# Patient Record
Sex: FEMALE | Race: WHITE
[De-identification: names, ages, dates, MRNs, and addresses within clinical notes are randomized per-mention and may not be internally consistent; named-entity substitution may affect disease eponyms.]

---

## 2018-08-22 ENCOUNTER — HOSPITAL ENCOUNTER (EMERGENCY)
Dept: HOSPITAL 88 - ER | Age: 28
Discharge: OTHER INSTITUTION | End: 2018-08-22
Payer: COMMERCIAL

## 2018-08-22 VITALS — DIASTOLIC BLOOD PRESSURE: 63 MMHG | SYSTOLIC BLOOD PRESSURE: 110 MMHG

## 2018-08-22 VITALS — HEIGHT: 66 IN | BODY MASS INDEX: 45 KG/M2 | WEIGHT: 280 LBS
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
## 2018-08-22 DIAGNOSIS — I95.9: ICD-10-CM

## 2018-08-22 DIAGNOSIS — R10.84: Primary | ICD-10-CM

## 2018-08-22 DIAGNOSIS — S30.1XXA: ICD-10-CM

## 2018-08-22 LAB
ALBUMIN SERPL-MCNC: 3.7 G/DL (ref 3.5–5)
ALBUMIN/GLOB SERPL: 1.1 {RATIO} (ref 0.8–2)
ALP SERPL-CCNC: 74 IU/L (ref 40–150)
ALT SERPL-CCNC: 17 IU/L (ref 0–55)
AMYLASE SERPL-CCNC: 45 U/L (ref 25–125)
ANION GAP SERPL CALC-SCNC: 15.5 MMOL/L (ref 8–16)
BASOPHILS # BLD AUTO: 0.1 10*3/UL (ref 0–0.1)
BASOPHILS NFR BLD AUTO: 0.3 % (ref 0–1)
BUN SERPL-MCNC: 14 MG/DL (ref 7–26)
BUN/CREAT SERPL: 18 (ref 6–25)
CALCIUM SERPL-MCNC: 9.1 MG/DL (ref 8.4–10.2)
CHLORIDE SERPL-SCNC: 106 MMOL/L (ref 98–107)
CK MB SERPL-MCNC: 1.4 NG/ML (ref 0–5)
CK SERPL-CCNC: 126 IU/L (ref 29–168)
CO2 SERPL-SCNC: 20 MMOL/L (ref 22–29)
DEPRECATED APTT PLAS QN: 22.4 SECONDS (ref 23.8–35.5)
DEPRECATED INR PLAS: 1.1
DEPRECATED NEUTROPHILS # BLD AUTO: 27.4 10*3/UL (ref 2.1–6.9)
EGFRCR SERPLBLD CKD-EPI 2021: > 60 ML/MIN (ref 60–?)
EOSINOPHIL # BLD AUTO: 0 10*3/UL (ref 0–0.4)
EOSINOPHIL NFR BLD AUTO: 0.1 % (ref 0–6)
ERYTHROCYTE [DISTWIDTH] IN CORD BLOOD: 13.3 % (ref 11.7–14.4)
GLOBULIN PLAS-MCNC: 3.5 G/DL (ref 2.3–3.5)
GLUCOSE SERPLBLD-MCNC: 113 MG/DL (ref 74–118)
HCT VFR BLD AUTO: 38.4 % (ref 34.2–44.1)
HGB BLD-MCNC: 12.7 G/DL (ref 12–16)
LIPASE SERPL-CCNC: 20 U/L (ref 8–78)
LYMPHOCYTES # BLD: 3.6 10*3/UL (ref 1–3.2)
LYMPHOCYTES NFR BLD AUTO: 10.8 % (ref 18–39.1)
LYMPHOCYTES NFR BLD MANUAL: 6 % (ref 19–48)
MACROCYTES BLD QL SMEAR: SLIGHT
MAGNESIUM SERPL-MCNC: 2 MG/DL (ref 1.3–2.1)
MCH RBC QN AUTO: 28.6 PG (ref 28–32)
MCHC RBC AUTO-ENTMCNC: 33.1 G/DL (ref 31–35)
MCV RBC AUTO: 86.5 FL (ref 81–99)
MONOCYTES # BLD AUTO: 1.8 10*3/UL (ref 0.2–0.8)
MONOCYTES NFR BLD AUTO: 5.3 % (ref 4.4–11.3)
MONOCYTES NFR BLD MANUAL: 6 % (ref 3.4–9)
NEUTS SEG NFR BLD AUTO: 82.8 % (ref 38.7–80)
NEUTS SEG NFR BLD MANUAL: 87 % (ref 40–74)
PLAT MORPH BLD: NORMAL
PLATELET # BLD AUTO: 430 X10E3/UL (ref 140–360)
PLATELET # BLD EST: (no result) 10*3/UL
POTASSIUM SERPL-SCNC: 3.5 MMOL/L (ref 3.5–5.1)
PROTHROMBIN TIME: 13.4 SECONDS (ref 11.9–14.5)
RBC # BLD AUTO: 4.44 X10E6/UL (ref 3.6–5.1)
RBC MORPH BLD: NORMAL
SODIUM SERPL-SCNC: 138 MMOL/L (ref 136–145)

## 2018-08-22 PROCEDURE — 80053 COMPREHEN METABOLIC PANEL: CPT

## 2018-08-22 PROCEDURE — 85730 THROMBOPLASTIN TIME PARTIAL: CPT

## 2018-08-22 PROCEDURE — 83690 ASSAY OF LIPASE: CPT

## 2018-08-22 PROCEDURE — 93005 ELECTROCARDIOGRAM TRACING: CPT

## 2018-08-22 PROCEDURE — 36415 COLL VENOUS BLD VENIPUNCTURE: CPT

## 2018-08-22 PROCEDURE — 82550 ASSAY OF CK (CPK): CPT

## 2018-08-22 PROCEDURE — 82553 CREATINE MB FRACTION: CPT

## 2018-08-22 PROCEDURE — 86850 RBC ANTIBODY SCREEN: CPT

## 2018-08-22 PROCEDURE — 71260 CT THORAX DX C+: CPT

## 2018-08-22 PROCEDURE — 74177 CT ABD & PELVIS W/CONTRAST: CPT

## 2018-08-22 PROCEDURE — 86922 COMPATIBILITY TEST ANTIGLOB: CPT

## 2018-08-22 PROCEDURE — 84484 ASSAY OF TROPONIN QUANT: CPT

## 2018-08-22 PROCEDURE — 84702 CHORIONIC GONADOTROPIN TEST: CPT

## 2018-08-22 PROCEDURE — 85025 COMPLETE CBC W/AUTO DIFF WBC: CPT

## 2018-08-22 PROCEDURE — 82150 ASSAY OF AMYLASE: CPT

## 2018-08-22 PROCEDURE — 86900 BLOOD TYPING SEROLOGIC ABO: CPT

## 2018-08-22 PROCEDURE — 83735 ASSAY OF MAGNESIUM: CPT

## 2018-08-22 PROCEDURE — 86920 COMPATIBILITY TEST SPIN: CPT

## 2018-08-22 PROCEDURE — 85610 PROTHROMBIN TIME: CPT

## 2018-08-22 PROCEDURE — 99284 EMERGENCY DEPT VISIT MOD MDM: CPT

## 2018-08-22 NOTE — DIAGNOSTIC IMAGING REPORT
EXAM: CT CHEST W, CT ABDOMEN/PELVIS W

INDICATION: \S\trauma

COMPARISON: None.

TECHNIQUE: Chest, abdomen and pelvis were scanned utilizing a multidetector

helical scanner from the lung apex to the pubic symphysis. Coronal and sagittal

reformations were obtained

            IV CONTRAST: 100 mL Isovue 300/370



FINDINGS:



LINES and TUBES: None.



LUNGS/AIRWAYS/PLEURA:  No contusion or consolidation.. The pleural spaces are

clear.



HEART AND MEDIASTINUM: Mild anterior mediastinal soft tissue likely thymus. No

mediastinal or periaortic hematoma.  The heart is normal in size.. There is no

pericardial effusion.     



HEPATOBILIARY/GALLBLADDER: Punctate too small to characterize liver

hypodensities. Otherwise unremarkable. No laceration. 



SPLEEN: No laceration or hematoma. 



PANCREAS: No laceration or hematoma.  



ADRENALS: No nodules.



KIDNEYS/URETERS: No laceration or hematoma. No hydronephrosis. 



GI TRACT: No obstruction or wall thickening.  Normal appendix.  



PELVIC ORGANS/BLADDER: Unremarkable. Incidental left corpus luteum.



LYMPH NODES: No lymphadenopathy.



VESSELS: Unremarkable.



PERITONEUM / RETROPERITONEUM: No free air or fluid.



BONES/SOFT TISSUES: No acute bony findings. There is a large superficial

hematoma/contusion within the anterior abdominal wall soft tissues/subcutaneous

fat. Overlying skin thickening. No active extravasation.



IMPRESSION: 

1.  No acute internal traumatic injury of the chest, abdomen or pelvis. 

2.  Large superficial hematoma/contusion within the anterior abdominal wall

soft tissues. No active extravasation.



Signed by: Dr Uzma Bautista MD on 8/22/2018 9:50 PM